# Patient Record
Sex: FEMALE | Race: WHITE | NOT HISPANIC OR LATINO | URBAN - NONMETROPOLITAN AREA
[De-identification: names, ages, dates, MRNs, and addresses within clinical notes are randomized per-mention and may not be internally consistent; named-entity substitution may affect disease eponyms.]

---

## 2020-01-23 NOTE — PATIENT DISCUSSION
1. Myopia w/ Presbyopia: Rx was given for correction if indicated and requested. Trial given of Dailies Total 1 MF. 2.  JUAN CARLOS w/ PEK: Begin ATs TID OU routinely (sample of Refresh Relieva given). 3.  Nuclear Sclerosis: Not visually significant. Observe. Return for an appointment in 1 week cc with Dr. Antoinette Watkins.

## 2020-02-20 NOTE — PATIENT DISCUSSION
CC today -- Good fit comfort and DVA. Patient unhappy with NVA adjusted CTLrx to HI add and dispensed new trials. Patient can call to finalize CTLrx as HI or MED add. Return for an appointment in 1 year 40/cc with Dr. Edyta Suarez.

## 2020-10-02 ENCOUNTER — IMPORTED ENCOUNTER (OUTPATIENT)
Dept: URBAN - NONMETROPOLITAN AREA CLINIC 1 | Facility: CLINIC | Age: 50
End: 2020-10-02

## 2020-10-02 PROBLEM — H52.13: Noted: 2020-10-02

## 2020-10-02 PROCEDURE — S0620 ROUTINE OPHTHALMOLOGICAL EXA: HCPCS

## 2020-10-02 PROCEDURE — 92310 CONTACT LENS FITTING OU: CPT

## 2020-10-14 ENCOUNTER — IMPORTED ENCOUNTER (OUTPATIENT)
Dept: URBAN - NONMETROPOLITAN AREA CLINIC 1 | Facility: CLINIC | Age: 50
End: 2020-10-14

## 2021-11-10 ENCOUNTER — IMPORTED ENCOUNTER (OUTPATIENT)
Dept: URBAN - NONMETROPOLITAN AREA CLINIC 1 | Facility: CLINIC | Age: 51
End: 2021-11-10

## 2021-11-10 PROCEDURE — 92310 CONTACT LENS FITTING OU: CPT

## 2021-11-10 PROCEDURE — 92014 COMPRE OPH EXAM EST PT 1/>: CPT

## 2021-11-10 PROCEDURE — 92015 DETERMINE REFRACTIVE STATE: CPT

## 2022-04-09 ASSESSMENT — TONOMETRY
OS_IOP_MMHG: 16
OD_IOP_MMHG: 16

## 2022-04-09 ASSESSMENT — VISUAL ACUITY
OS_CC: J1
OD_CC: J1
OU_CC: 20/20-1
OS_SC: 20/20-1
OD_SC: 20/20
OD_SC: 20/20-1
OS_SC: 20/20

## 2022-10-28 ENCOUNTER — ESTABLISHED PATIENT (OUTPATIENT)
Dept: RURAL CLINIC 1 | Facility: CLINIC | Age: 52
End: 2022-10-28

## 2022-10-28 DIAGNOSIS — H52.13: ICD-10-CM

## 2022-10-28 PROCEDURE — 92014 COMPRE OPH EXAM EST PT 1/>: CPT

## 2022-10-28 PROCEDURE — 92015 DETERMINE REFRACTIVE STATE: CPT

## 2022-10-28 PROCEDURE — 92310-E CONTACT LENS FITTING ESTABLISH PATIENT

## 2022-10-28 ASSESSMENT — VISUAL ACUITY
OS_CC: 20/20
OS_CC: 20/30-1
OU_SC: 20/20
OD_CC: 20/30-1
OU_CC: 20/30-1
OD_SC: 20/20
OD_CC: 20/20
OU_CC: 20/20
OS_SC: 20/20

## 2022-10-28 ASSESSMENT — TONOMETRY
OD_IOP_MMHG: 16
OS_IOP_MMHG: 16

## 2023-11-01 ENCOUNTER — ESTABLISHED PATIENT (OUTPATIENT)
Dept: RURAL CLINIC 1 | Facility: CLINIC | Age: 53
End: 2023-11-01

## 2023-11-01 DIAGNOSIS — H52.13: ICD-10-CM

## 2023-11-01 PROCEDURE — 92310-E CONTACT LENS FITTING ESTABLISH PATIENT

## 2023-11-01 PROCEDURE — 92015 DETERMINE REFRACTIVE STATE: CPT

## 2023-11-01 PROCEDURE — 92014 COMPRE OPH EXAM EST PT 1/>: CPT

## 2023-11-01 ASSESSMENT — TONOMETRY
OS_IOP_MMHG: 15
OD_IOP_MMHG: 15

## 2023-11-01 ASSESSMENT — VISUAL ACUITY
OU_CC: 20/20
OD_CC: 20/25-2
OS_CC: 20/20-1

## 2024-11-06 ENCOUNTER — COMPREHENSIVE EXAM (OUTPATIENT)
Dept: RURAL CLINIC 1 | Facility: CLINIC | Age: 54
End: 2024-11-06

## 2024-11-06 DIAGNOSIS — H52.13: ICD-10-CM

## 2024-11-06 DIAGNOSIS — H25.13: ICD-10-CM

## 2024-11-06 PROCEDURE — 92014 COMPRE OPH EXAM EST PT 1/>: CPT

## 2024-11-06 PROCEDURE — 92015 DETERMINE REFRACTIVE STATE: CPT

## 2024-11-06 PROCEDURE — 92310-1 LEVEL 1 SOFT LENS UPDATE
